# Patient Record
Sex: FEMALE | Race: WHITE | NOT HISPANIC OR LATINO | Employment: PART TIME | URBAN - METROPOLITAN AREA
[De-identification: names, ages, dates, MRNs, and addresses within clinical notes are randomized per-mention and may not be internally consistent; named-entity substitution may affect disease eponyms.]

---

## 2021-04-07 ENCOUNTER — ANNUAL EXAM (OUTPATIENT)
Dept: OBGYN CLINIC | Facility: CLINIC | Age: 26
End: 2021-04-07
Payer: COMMERCIAL

## 2021-04-07 VITALS
DIASTOLIC BLOOD PRESSURE: 92 MMHG | HEIGHT: 69 IN | WEIGHT: 184 LBS | BODY MASS INDEX: 27.25 KG/M2 | SYSTOLIC BLOOD PRESSURE: 142 MMHG

## 2021-04-07 DIAGNOSIS — Z01.419 ROUTINE GYNECOLOGICAL EXAMINATION: Primary | ICD-10-CM

## 2021-04-07 DIAGNOSIS — Z11.3 SCREEN FOR STD (SEXUALLY TRANSMITTED DISEASE): ICD-10-CM

## 2021-04-07 DIAGNOSIS — Z30.41 SURVEILLANCE OF PREVIOUSLY PRESCRIBED CONTRACEPTIVE PILL: ICD-10-CM

## 2021-04-07 PROCEDURE — 99385 PREV VISIT NEW AGE 18-39: CPT | Performed by: OBSTETRICS & GYNECOLOGY

## 2021-04-07 RX ORDER — MELATONIN
2000 DAILY
COMMUNITY

## 2021-04-07 RX ORDER — DROSPIRENONE AND ETHINYL ESTRADIOL 0.03MG-3MG
1 KIT ORAL DAILY
COMMUNITY
End: 2021-04-07 | Stop reason: SDUPTHER

## 2021-04-07 RX ORDER — DROSPIRENONE AND ETHINYL ESTRADIOL 0.03MG-3MG
1 KIT ORAL DAILY
Qty: 84 TABLET | Refills: 4 | Status: SHIPPED | OUTPATIENT
Start: 2021-04-07 | End: 2021-04-29 | Stop reason: SDUPTHER

## 2021-04-07 NOTE — PROGRESS NOTES
[address], [address], [facility], [address] [facility]    ASSESSMENT/PLAN: Mary Flores is a 23 y o  [place] who presents for annual gynecologic exam     Encounter for routine gynecologic examination  - Routine well woman exam completed today  - Cervical Cancer Screening: Current ASCCP Guidelines reviewed  Last Pap: 12/13/2019   Next Pap Due: 2022  - HPV Vaccination status: Immunization series complete  - STI screening offered including HIV testing: GC/CT done, others declined  - Contraceptive counseling discussed  Current contraception: oral contraceptives (estrogen/progesterone),  - The following were reviewed in today's visit: STD testing and use and side effects of OCPs    Additional problems addressed during this visit:  1  Routine gynecological examination  -     Chlamydia/GC amplified DNA by PCR    2  Surveillance of previously prescribed contraceptive pill  Assessment & Plan:  Using OCs without side effects  Questions re long term use  She has no plans for pregnancy in the future  Discussed Paragard if she desires long term non hormonal contraception    Orders:  -     drospirenone-ethinyl estradiol (VIVIEN) 3-0 03 MG per tablet; Take 1 tablet by mouth daily    3  Screen for STD (sexually transmitted disease)  -     Chlamydia/GC amplified DNA by PCR      CC:  Annual Gynecologic Examination    HPI: Mary Flores is a 23 y o  [place] who presents for annual gynecologic examination  Gynecologic Exam        The following portions of the patient's history were reviewed and updated as appropriate: She  has a past medical history of Chlamydia and Papanicolaou smear (12/2019)  She  has a past surgical history that includes [place] tooth extraction (2009)  Her family history includes Diabetes in her father; Thyroid disease in her mother  She  reports that she has never smoked  She has never used smokeless tobacco  She reports current alcohol use  She reports that she does not use drugs    Current Outpatient Medications   Medication Sig Dispense Refill    cholecalciferol (VITAMIN D3) 1,000 units tablet Take 2,000 Units by mouth daily      drospirenone-ethinyl estradiol (VIVIEN) 3-0 03 MG per tablet Take 1 tablet by mouth daily 84 tablet 4     No current facility-administered medications for this visit  She is allergic to no known allergies       ROS negative except as noted in HPI        Objective:  /92   Ht 5' 9" (1 753 m)   Wt 83 5 kg (184 lb)   LMP 03/29/2021   BMI 27 17 kg/m²    Physical Exam  Vitals signs reviewed  Exam conducted with a chaperone present  Constitutional:       Appearance: Normal appearance  She is normal weight  HENT:      Head: Normocephalic and atraumatic  Eyes:      Pupils: Pupils are equal, round, and reactive to light  Neck:      Musculoskeletal: Normal range of motion  Thyroid: No thyroid mass, thyromegaly or thyroid tenderness  Cardiovascular:      Rate and Rhythm: Normal rate  Pulmonary:      Effort: Pulmonary effort is normal    Abdominal:      General: Abdomen is flat  Bowel sounds are normal       Palpations: Abdomen is soft  Genitourinary:     General: Normal vulva  Exam position: Lithotomy position  Vagina: Normal       Cervix: Normal       Uterus: Normal        Adnexa: Right adnexa normal and left adnexa normal    Neurological:      Mental Status: She is alert     Psychiatric:         Mood and Affect: Mood normal

## 2021-04-07 NOTE — ASSESSMENT & PLAN NOTE
Using OCs without side effects  Questions re long term use  She has no plans for pregnancy in the future   Discussed Paragard if she desires long term non hormonal contraception

## 2021-04-08 LAB
C TRACH RRNA SPEC QL NAA+PROBE: NEGATIVE
N GONORRHOEA RRNA SPEC QL NAA+PROBE: NEGATIVE

## 2021-04-27 DIAGNOSIS — Z30.011 ENCOUNTER FOR PRESCRIPTION OF ORAL CONTRACEPTIVES: Primary | ICD-10-CM

## 2021-04-27 DIAGNOSIS — Z30.41 SURVEILLANCE OF PREVIOUSLY PRESCRIBED CONTRACEPTIVE PILL: ICD-10-CM

## 2021-04-29 RX ORDER — DROSPIRENONE AND ETHINYL ESTRADIOL 0.03MG-3MG
1 KIT ORAL DAILY
Qty: 84 TABLET | Refills: 4 | Status: SHIPPED | OUTPATIENT
Start: 2021-04-29

## 2021-06-18 ENCOUNTER — TELEPHONE (OUTPATIENT)
Dept: OBGYN CLINIC | Facility: CLINIC | Age: 26
End: 2021-06-18

## 2021-06-18 NOTE — TELEPHONE ENCOUNTER
Pt l/m requesting to change pharmacy  Her old pharmacy was [facility] in [facility], she requesting a c/b so she can provide further info  L/m on pt's vm to call back

## 2021-06-18 NOTE — TELEPHONE ENCOUNTER
Pt states that the new pharmacy is [facility] [place]  Advised pt to contact the [facility] pharmacy near her to transfer the script from [facility] in [facility]  Pt verbalized understanding

## 2021-08-25 ENCOUNTER — TELEPHONE (OUTPATIENT)
Dept: OBGYN CLINIC | Facility: CLINIC | Age: 26
End: 2021-08-25

## 2021-08-25 DIAGNOSIS — N76.0 ACUTE VAGINITIS: Primary | ICD-10-CM

## 2021-08-25 RX ORDER — FLUCONAZOLE 150 MG/1
150 TABLET ORAL ONCE
Qty: 1 TABLET | Refills: 0 | Status: SHIPPED | OUTPATIENT
Start: 2021-08-25 | End: 2021-08-25

## 2021-08-25 NOTE — TELEPHONE ENCOUNTER
Jackson Hargrove l/m she thinks she is starting with a yeast infection  The last time she had one it did not respond to monistat and she ended up needing the pill  She would like to know what to use OTC so it doesn't get to that point again  L/M on patient cell v/m to c/b to discuss symptoms

## 2021-08-25 NOTE — TELEPHONE ENCOUNTER
Spoke with patient and she informed that she had recurring yeast infections, OTC Monistat had not been  effective, and Zulema had treated with Diflucan with resolution of symptoms  Pt reports the onset of vaginal itching, irritation and slight change in her vaginal discharge, discharge is white with no odor  Pt voiced concern that she would like to take the Diflucan so this does not become a recurring at is had been  Advised open to air, change out of wet clothing, change towels daily, no loofa use, avoid high sugar diet, no perfume soaps or products , may use A&D oint externally to provide a barrier and help alleviate external irritation  Pt requesting to have a script for Diflucan to be sent to [address] [facility] on file  Pt is aware provider is out of the office today and will address tomorrow upon return

## 2021-08-25 NOTE — TELEPHONE ENCOUNTER
Pt last  seen by  Dr Liya Ellis  Will need to be seen if  this reoccurs for a wet mount and  cultures  Will send Difucan  150 mg x 1  Needs visit next time  Best  bet is the  Monistat 7 start with onset of symptoms!

## 2021-08-26 NOTE — TELEPHONE ENCOUNTER
Left a message for pt informing Rx for Diflucan has been sent to pharm  If symptoms do not improve or recur, will need a pelvic exam for further evaluation  Recommend Monistat 7 for onset symptoms  If pt has any additional questions to call back

## 2021-10-08 ENCOUNTER — TELEPHONE (OUTPATIENT)
Dept: OBGYN CLINIC | Facility: CLINIC | Age: 26
End: 2021-10-08